# Patient Record
Sex: MALE | Race: BLACK OR AFRICAN AMERICAN | NOT HISPANIC OR LATINO | Employment: STUDENT | URBAN - METROPOLITAN AREA
[De-identification: names, ages, dates, MRNs, and addresses within clinical notes are randomized per-mention and may not be internally consistent; named-entity substitution may affect disease eponyms.]

---
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
## 2024-06-28 ENCOUNTER — OFFICE VISIT (OUTPATIENT)
Dept: INTERNAL MEDICINE | Facility: CLINIC | Age: 24
End: 2024-06-28
Payer: COMMERCIAL

## 2024-06-28 VITALS
TEMPERATURE: 99 F | BODY MASS INDEX: 27.36 KG/M2 | WEIGHT: 202 LBS | OXYGEN SATURATION: 97 % | HEIGHT: 72 IN | SYSTOLIC BLOOD PRESSURE: 124 MMHG | HEART RATE: 78 BPM | DIASTOLIC BLOOD PRESSURE: 92 MMHG

## 2024-06-28 DIAGNOSIS — F33.2 SEVERE EPISODE OF RECURRENT MAJOR DEPRESSIVE DISORDER, WITHOUT PSYCHOTIC FEATURES: Primary | ICD-10-CM

## 2024-06-28 DIAGNOSIS — F39 MOOD DISORDER: ICD-10-CM

## 2024-06-28 DIAGNOSIS — R45.851 SUICIDAL IDEATION: ICD-10-CM

## 2024-06-28 PROCEDURE — 99999 PR PBB SHADOW E&M-NEW PATIENT-LVL V: CPT | Mod: PBBFAC,,, | Performed by: PHYSICIAN ASSISTANT

## 2024-06-28 RX ORDER — HYDROCHLOROTHIAZIDE 25 MG/1
25 TABLET ORAL
COMMUNITY
Start: 2024-06-18 | End: 2024-12-15

## 2024-06-28 RX ORDER — RISANKIZUMAB-RZAA 360 MG/2.4
360 WEARABLE INJECTOR SUBCUTANEOUS
COMMUNITY

## 2024-06-28 RX ORDER — SERTRALINE HYDROCHLORIDE 50 MG/1
50 TABLET, FILM COATED ORAL DAILY
Qty: 30 TABLET | Refills: 11 | Status: SHIPPED | OUTPATIENT
Start: 2024-06-28 | End: 2025-06-28

## 2024-06-28 NOTE — PROGRESS NOTES
Subjective:      Patient ID: Noel Mosley is a 23 y.o. male.    Chief Complaint: Anxiety (Pt present today requesting psych evaluation c/o being unable to control angry went upset, having anxiety, and having sad thoughts leading him to feel sad )    Depression  Visit Type: initial  Onset of symptoms: at an unknown time  Patient presents with the following symptoms: decreased concentration, nervousness/anxiety and suicidal ideas.  Patient is not experiencing: choking sensation, confusion, palpitations and shortness of breath.        Here today discuss getting referred to see a psychiatrist. Pt states that he has had issues with anger and controlling his emotions for years but getting worse recently. Pt states that he feels like he gets so mad he can physically hurt someone or hurt himself. He has had suicidal ideations and even thought of a plan but never followed through. He admits to having access to a weapon in his home. No current thoughts/plans of suicide.   He has also increased etoh consumption to deal with his emotions. Starting to affect his relationship with friends and family.     Patient Active Problem List   Diagnosis    Sprain of finger of right hand, little at PIP joint         Current Outpatient Medications:     hydroCHLOROthiazide (HYDRODIURIL) 25 MG tablet, Take 25 mg by mouth., Disp: , Rfl:     SKYRIZI 360 mg/2.4 mL (150 mg/mL) Injt, Inject 360 mg into the skin., Disp: , Rfl:     sertraline (ZOLOFT) 50 MG tablet, Take 1 tablet (50 mg total) by mouth once daily., Disp: 30 tablet, Rfl: 11    Review of Systems   Constitutional:  Negative for activity change, appetite change, chills, diaphoresis, fatigue, fever and unexpected weight change.   HENT: Negative.  Negative for congestion, hearing loss, postnasal drip, rhinorrhea, sore throat, trouble swallowing and voice change.    Eyes: Negative.  Negative for visual disturbance.   Respiratory: Negative.  Negative for cough, choking, chest  tightness and shortness of breath.    Cardiovascular:  Negative for chest pain, palpitations and leg swelling.   Gastrointestinal:  Negative for abdominal distention, abdominal pain, blood in stool, constipation, diarrhea, nausea and vomiting.   Endocrine: Negative for cold intolerance, heat intolerance, polydipsia and polyuria.   Genitourinary: Negative.  Negative for difficulty urinating and frequency.   Musculoskeletal:  Negative for arthralgias, back pain, gait problem, joint swelling and myalgias.   Skin:  Negative for color change, pallor, rash and wound.   Neurological:  Negative for dizziness, tremors, weakness, light-headedness, numbness and headaches.   Hematological:  Negative for adenopathy.   Psychiatric/Behavioral:  Positive for agitation, behavioral problems, decreased concentration, depression, dysphoric mood and suicidal ideas. Negative for confusion, hallucinations, self-injury and sleep disturbance. The patient is nervous/anxious. The patient is not hyperactive.      Objective:   BP (!) 124/92 (BP Location: Right arm, Patient Position: Sitting, BP Method: Medium (Manual))   Pulse 78   Temp 98.8 °F (37.1 °C) (Tympanic)   Ht 6' (1.829 m)   Wt 91.6 kg (202 lb)   SpO2 97%   BMI 27.40 kg/m²     Physical Exam  Vitals and nursing note reviewed.   Constitutional:       General: He is not in acute distress.     Appearance: Normal appearance. He is well-developed. He is not ill-appearing, toxic-appearing or diaphoretic.   HENT:      Head: Normocephalic and atraumatic.   Cardiovascular:      Rate and Rhythm: Normal rate and regular rhythm.      Heart sounds: Normal heart sounds. No murmur heard.     No friction rub. No gallop.   Pulmonary:      Effort: Pulmonary effort is normal. No respiratory distress.      Breath sounds: Normal breath sounds. No wheezing or rales.   Skin:     General: Skin is warm.      Findings: No rash.   Neurological:      Mental Status: He is alert and oriented to person, place,  and time.   Psychiatric:         Attention and Perception: Attention and perception normal.         Mood and Affect: Mood is anxious and depressed. Affect is tearful.         Speech: Speech normal.         Behavior: Behavior normal.         Thought Content: Thought content normal. Thought content is not paranoid or delusional. Thought content does not include homicidal or suicidal ideation. Thought content does not include homicidal or suicidal plan.         Cognition and Memory: Cognition and memory normal.         Judgment: Judgment normal.       Assessment:     1. Severe episode of recurrent major depressive disorder, without psychotic features    2. Mood disorder    3. Suicidal ideation      Plan:   Severe episode of recurrent major depressive disorder, without psychotic features  -     sertraline (ZOLOFT) 50 MG tablet; Take 1 tablet (50 mg total) by mouth once daily.  Dispense: 30 tablet; Refill: 11  -     Ambulatory referral/consult to Psychiatry; Future; Expected date: 07/05/2024  -     Ambulatory referral/consult to Psychiatry; Future; Expected date: 07/05/2024    Mood disorder  -     sertraline (ZOLOFT) 50 MG tablet; Take 1 tablet (50 mg total) by mouth once daily.  Dispense: 30 tablet; Refill: 11  -     Ambulatory referral/consult to Psychiatry; Future; Expected date: 07/05/2024  -     Ambulatory referral/consult to Psychiatry; Future; Expected date: 07/05/2024    Suicidal ideation  -     Ambulatory referral/consult to Psychiatry; Future; Expected date: 07/05/2024  -     Ambulatory referral/consult to Psychiatry; Future; Expected date: 07/05/2024    -diagnosis and treatment options discussed with the patient. Recommend pharmacotherapy along with cog behavioral therapy/psych referral.   -will put in a referral to see a psychiatrist for testing/evaluation and also a therapist.   -SE sertraline discussed  -recheck in 1 month  -if suicidal thoughts/planning then call 911.   Educational handout on  over-the-counter medications and at-home conservative care, pertinent to the patients diagnosis today, was handed to the patient and discussed in detail.      Follow up in about 4 weeks (around 7/26/2024), or if symptoms worsen or fail to improve.

## 2024-07-25 ENCOUNTER — TELEPHONE (OUTPATIENT)
Dept: INTERNAL MEDICINE | Facility: CLINIC | Age: 24
End: 2024-07-25
Payer: COMMERCIAL

## 2024-07-25 NOTE — TELEPHONE ENCOUNTER
Attempted to call the pt to reschedule appt due to the provider not in clinic. No answer lvm stating appt will be canceled please give the clinic a call back to reschedule.\\AR

## 2024-08-05 ENCOUNTER — TELEPHONE (OUTPATIENT)
Dept: PSYCHIATRY | Facility: CLINIC | Age: 24
End: 2024-08-05
Payer: COMMERCIAL

## 2024-08-19 ENCOUNTER — TELEPHONE (OUTPATIENT)
Dept: PSYCHIATRY | Facility: CLINIC | Age: 24
End: 2024-08-19
Payer: COMMERCIAL